# Patient Record
(demographics unavailable — no encounter records)

---

## 2025-07-10 NOTE — DISCUSSION/SUMMARY
[FreeTextEntry1] : discuss vaginal discharge It could be BV or it could be a change in the vaginal environment due to recent serious illness and amitotic therapy If Affirm is positive then metronidazole gel If negative, the Boric acid suppository for 30 days except during the period

## 2025-07-10 NOTE — PHYSICAL EXAM
[Labia Majora] : normal [Labia Minora] : normal [Discharge] : a  ~M vaginal discharge was present [Moderate] : moderate [Foul Smelling] : not foul smelling [Marta] : yellow [Thin] : thin [Normal] : normal [Uterine Adnexae] : normal

## 2025-07-10 NOTE — HISTORY OF PRESENT ILLNESS
[FreeTextEntry1] : para 1011 Last exam GYN 6 months ago She was hospitalized 2 months ago for colitis and treated with several antibiotics  Since then she has had yellow/greenish discharge and some vaginal burning   No odor